# Patient Record
Sex: FEMALE
[De-identification: names, ages, dates, MRNs, and addresses within clinical notes are randomized per-mention and may not be internally consistent; named-entity substitution may affect disease eponyms.]

---

## 2018-12-05 PROBLEM — Z00.00 ENCOUNTER FOR PREVENTIVE HEALTH EXAMINATION: Status: ACTIVE | Noted: 2018-12-05

## 2018-12-11 ENCOUNTER — APPOINTMENT (OUTPATIENT)
Dept: MAMMOGRAPHY | Facility: CLINIC | Age: 38
End: 2018-12-11
Payer: COMMERCIAL

## 2018-12-11 ENCOUNTER — OUTPATIENT (OUTPATIENT)
Dept: OUTPATIENT SERVICES | Facility: HOSPITAL | Age: 38
LOS: 1 days | End: 2018-12-11
Payer: SELF-PAY

## 2018-12-11 DIAGNOSIS — Z00.8 ENCOUNTER FOR OTHER GENERAL EXAMINATION: ICD-10-CM

## 2018-12-11 DIAGNOSIS — Z12.31 ENCOUNTER FOR SCREENING MAMMOGRAM FOR MALIGNANT NEOPLASM OF BREAST: ICD-10-CM

## 2018-12-11 PROCEDURE — 77067 SCR MAMMO BI INCL CAD: CPT

## 2018-12-11 PROCEDURE — 77063 BREAST TOMOSYNTHESIS BI: CPT | Mod: 26

## 2018-12-11 PROCEDURE — 77067 SCR MAMMO BI INCL CAD: CPT | Mod: 26

## 2018-12-11 PROCEDURE — 77063 BREAST TOMOSYNTHESIS BI: CPT

## 2019-01-10 ENCOUNTER — EMERGENCY (EMERGENCY)
Facility: HOSPITAL | Age: 39
LOS: 1 days | Discharge: HOME | End: 2019-01-10
Attending: EMERGENCY MEDICINE
Payer: COMMERCIAL

## 2019-01-10 VITALS
RESPIRATION RATE: 22 BRPM | SYSTOLIC BLOOD PRESSURE: 130 MMHG | OXYGEN SATURATION: 100 % | WEIGHT: 119.93 LBS | DIASTOLIC BLOOD PRESSURE: 82 MMHG | HEIGHT: 62 IN | TEMPERATURE: 98 F | HEART RATE: 75 BPM

## 2019-01-10 LAB
ALBUMIN SERPL ELPH-MCNC: 4.6 G/DL — SIGNIFICANT CHANGE UP (ref 3.3–5.2)
ALP SERPL-CCNC: 83 U/L — SIGNIFICANT CHANGE UP (ref 40–120)
ALT FLD-CCNC: 36 U/L — HIGH
ANION GAP SERPL CALC-SCNC: 14 MMOL/L — SIGNIFICANT CHANGE UP (ref 5–17)
APPEARANCE UR: CLEAR — SIGNIFICANT CHANGE UP
AST SERPL-CCNC: 35 U/L — HIGH
BACTERIA # UR AUTO: ABNORMAL
BASOPHILS # BLD AUTO: 0 K/UL — SIGNIFICANT CHANGE UP (ref 0–0.2)
BASOPHILS NFR BLD AUTO: 0.2 % — SIGNIFICANT CHANGE UP (ref 0–2)
BILIRUB SERPL-MCNC: 0.3 MG/DL — LOW (ref 0.4–2)
BILIRUB UR-MCNC: NEGATIVE — SIGNIFICANT CHANGE UP
BUN SERPL-MCNC: 12 MG/DL — SIGNIFICANT CHANGE UP (ref 8–20)
CALCIUM SERPL-MCNC: 9.6 MG/DL — SIGNIFICANT CHANGE UP (ref 8.6–10.2)
CHLORIDE SERPL-SCNC: 102 MMOL/L — SIGNIFICANT CHANGE UP (ref 98–107)
CO2 SERPL-SCNC: 24 MMOL/L — SIGNIFICANT CHANGE UP (ref 22–29)
COLOR SPEC: YELLOW — SIGNIFICANT CHANGE UP
CREAT SERPL-MCNC: 0.58 MG/DL — SIGNIFICANT CHANGE UP (ref 0.5–1.3)
DIFF PNL FLD: ABNORMAL
EOSINOPHIL # BLD AUTO: 0.1 K/UL — SIGNIFICANT CHANGE UP (ref 0–0.5)
EOSINOPHIL NFR BLD AUTO: 0.8 % — SIGNIFICANT CHANGE UP (ref 0–6)
EPI CELLS # UR: ABNORMAL
GLUCOSE SERPL-MCNC: 86 MG/DL — SIGNIFICANT CHANGE UP (ref 70–115)
GLUCOSE UR QL: NEGATIVE MG/DL — SIGNIFICANT CHANGE UP
HCG SERPL-ACNC: <4 MIU/ML — SIGNIFICANT CHANGE UP
HCG UR QL: NEGATIVE — SIGNIFICANT CHANGE UP
HCT VFR BLD CALC: 38.2 % — SIGNIFICANT CHANGE UP (ref 37–47)
HGB BLD-MCNC: 12.9 G/DL — SIGNIFICANT CHANGE UP (ref 12–16)
KETONES UR-MCNC: NEGATIVE — SIGNIFICANT CHANGE UP
LEUKOCYTE ESTERASE UR-ACNC: NEGATIVE — SIGNIFICANT CHANGE UP
LIDOCAIN IGE QN: 33 U/L — SIGNIFICANT CHANGE UP (ref 22–51)
LYMPHOCYTES # BLD AUTO: 1.2 K/UL — SIGNIFICANT CHANGE UP (ref 1–4.8)
LYMPHOCYTES # BLD AUTO: 12.2 % — LOW (ref 20–55)
MCHC RBC-ENTMCNC: 28.2 PG — SIGNIFICANT CHANGE UP (ref 27–31)
MCHC RBC-ENTMCNC: 33.8 G/DL — SIGNIFICANT CHANGE UP (ref 32–36)
MCV RBC AUTO: 83.4 FL — SIGNIFICANT CHANGE UP (ref 81–99)
MONOCYTES # BLD AUTO: 0.9 K/UL — HIGH (ref 0–0.8)
MONOCYTES NFR BLD AUTO: 9.6 % — SIGNIFICANT CHANGE UP (ref 3–10)
NEUTROPHILS # BLD AUTO: 7.4 K/UL — SIGNIFICANT CHANGE UP (ref 1.8–8)
NEUTROPHILS NFR BLD AUTO: 76.9 % — HIGH (ref 37–73)
NITRITE UR-MCNC: NEGATIVE — SIGNIFICANT CHANGE UP
PH UR: 6 — SIGNIFICANT CHANGE UP (ref 5–8)
PLATELET # BLD AUTO: 198 K/UL — SIGNIFICANT CHANGE UP (ref 150–400)
POTASSIUM SERPL-MCNC: 4 MMOL/L — SIGNIFICANT CHANGE UP (ref 3.5–5.3)
POTASSIUM SERPL-SCNC: 4 MMOL/L — SIGNIFICANT CHANGE UP (ref 3.5–5.3)
PROT SERPL-MCNC: 7.9 G/DL — SIGNIFICANT CHANGE UP (ref 6.6–8.7)
PROT UR-MCNC: NEGATIVE MG/DL — SIGNIFICANT CHANGE UP
RBC # BLD: 4.58 M/UL — SIGNIFICANT CHANGE UP (ref 4.4–5.2)
RBC # FLD: 12.9 % — SIGNIFICANT CHANGE UP (ref 11–15.6)
RBC CASTS # UR COMP ASSIST: ABNORMAL /HPF (ref 0–4)
SODIUM SERPL-SCNC: 140 MMOL/L — SIGNIFICANT CHANGE UP (ref 135–145)
SP GR SPEC: 1.01 — SIGNIFICANT CHANGE UP (ref 1.01–1.02)
UROBILINOGEN FLD QL: NEGATIVE MG/DL — SIGNIFICANT CHANGE UP
WBC # BLD: 9.6 K/UL — SIGNIFICANT CHANGE UP (ref 4.8–10.8)
WBC # FLD AUTO: 9.6 K/UL — SIGNIFICANT CHANGE UP (ref 4.8–10.8)
WBC UR QL: SIGNIFICANT CHANGE UP

## 2019-01-10 PROCEDURE — 99284 EMERGENCY DEPT VISIT MOD MDM: CPT

## 2019-01-10 PROCEDURE — 99282 EMERGENCY DEPT VISIT SF MDM: CPT

## 2019-01-10 RX ORDER — KETOROLAC TROMETHAMINE 30 MG/ML
30 SYRINGE (ML) INJECTION ONCE
Qty: 0 | Refills: 0 | Status: DISCONTINUED | OUTPATIENT
Start: 2019-01-10 | End: 2019-01-10

## 2019-01-10 RX ORDER — SODIUM CHLORIDE 9 MG/ML
3 INJECTION INTRAMUSCULAR; INTRAVENOUS; SUBCUTANEOUS ONCE
Qty: 0 | Refills: 0 | Status: COMPLETED | OUTPATIENT
Start: 2019-01-10 | End: 2019-01-10

## 2019-01-10 RX ORDER — SODIUM CHLORIDE 9 MG/ML
1000 INJECTION INTRAMUSCULAR; INTRAVENOUS; SUBCUTANEOUS ONCE
Qty: 0 | Refills: 0 | Status: COMPLETED | OUTPATIENT
Start: 2019-01-10 | End: 2019-01-10

## 2019-01-10 RX ORDER — ONDANSETRON 8 MG/1
4 TABLET, FILM COATED ORAL ONCE
Qty: 0 | Refills: 0 | Status: COMPLETED | OUTPATIENT
Start: 2019-01-10 | End: 2019-01-10

## 2019-01-10 RX ORDER — MORPHINE SULFATE 50 MG/1
4 CAPSULE, EXTENDED RELEASE ORAL ONCE
Qty: 0 | Refills: 0 | Status: DISCONTINUED | OUTPATIENT
Start: 2019-01-10 | End: 2019-01-10

## 2019-01-10 RX ADMIN — SODIUM CHLORIDE 3 MILLILITER(S): 9 INJECTION INTRAMUSCULAR; INTRAVENOUS; SUBCUTANEOUS at 22:50

## 2019-01-10 RX ADMIN — ONDANSETRON 4 MILLIGRAM(S): 8 TABLET, FILM COATED ORAL at 22:49

## 2019-01-10 NOTE — ED PROVIDER NOTE - ENMT, MLM
Airway patent, Nasal mucosa clear. Mouth with normal mucosa. No oropharyngeal exudates and uvula is midline.

## 2019-01-10 NOTE — ED PROVIDER NOTE - PHYSICAL EXAMINATION
Patient examined by pradeep Rollins chest clear heart regular rhythm abd marked left lower quadrant tenderness with guarding and rigidity.  r/o torsion of ovarian cyst r/o diverticulitis

## 2019-01-10 NOTE — ED PROVIDER NOTE - MEDICAL DECISION MAKING DETAILS
will do cbc cmp labs will do first ultrasound and medicate for pain. If gyn workup is negative will do ct of abd and pelvis to r/o diverticulitis.

## 2019-01-10 NOTE — ED STATDOCS - OBJECTIVE STATEMENT
37 y/o F pt presents to the ED c/o worsening abdominal pain beginning yesterday.  Pt reports LLQ abdominal pain beginning yesterday.  Today her pain worsened, pt went into Urgent Care, and was told to come into the ED for further evaluation.  Denies N/V.  Exam: LLQ tenderness, + guarding  Pt will be sent to the Main ED for further treatment and evaluation. Initial orders placed. r/o torsion vs ovarian cyst, will medicate first, then obtain US. Pt is a 38y F with no PMH presenting with LLQ pain x 5 days. Pt states she had associated dysuria so thought she had a UTI. She went to her PMH x 2 days ago who checked her urine (negative urinalysis) but pt was placed on abx. She states her dysuria has subsided but she still has LLQ pain. She went to urgent care today and was sent here for further workup. She denies any fever/vaginal bleeding/diarrhea/vomiting. She does not use birth control. LMP was Dec. 24th. No other complaints.

## 2019-01-10 NOTE — ED PROVIDER NOTE - OBJECTIVE STATEMENT
Pt is 38y F w/ no PMHx, LMP ,  c/o of abdominal pain. Pain started yesterday, in LLQ, cramping in nature and is 9-10/10 severity. She states the pain makes it difficult for her to sit still. She denies N/V, fever, SOB sick contacts, hx of ovarian cysts, constipation/diarrhea or urinary frequency/dysuria. Did see her PMD last week for similar pain in LLQ which less severe but sxs resolved quickly at that time. Pt is 38y F w/ no PMHx, LMP ,  c/o of abdominal pain. Pain started yesterday, in LLQ, cramping in nature and is 9-10/10 severity. She states the pain makes it difficult for her to sit still. She denies N/V, fever, SOB sick contacts, hx of ovarian cysts, constipation/diarrhea or urinary frequency/dysuria. Did see her PMD last week for similar pain in LLQ which less severe but sxs resolved quickly at that time.   Attending note Hx as above. I was present when hx wa taken

## 2019-01-10 NOTE — ED PROVIDER NOTE - CHPI ED SYMPTOMS NEG
no dysuria/no fever/no hematuria/no nausea/no vomiting/no diarrhea/no burning urination/no abdominal distension

## 2019-01-10 NOTE — ED STATDOCS - CHPI ED SYMPTOM NEG
no hematuria/no abdominal distention/no diarrhea/no vomiting/no palpitations/no burning urination/no fever/no blood in stool/no dysuria

## 2019-01-10 NOTE — ED STATDOCS - ATTENDING CONTRIBUTION TO CARE
I, Mt Rollins, performed the initial face to face bedside interview with this patient regarding history of present illness, review of symptoms and relevant past medical, social and family history.  I completed an independent physical examination.  I was the initial provider who evaluated this patient. I have signed out the follow up of any pending tests (i.e. labs, radiological studies) to the ACP.  I have communicated the patient’s plan of care and disposition with the ACP.  The history, relevant review of systems, past medical and surgical history, medical decision making, and physical examination was documented by the scribe in my presence and I attest to the accuracy of the documentation.

## 2019-01-10 NOTE — ED ADULT TRIAGE NOTE - CHIEF COMPLAINT QUOTE
seen at [facility] MD pain in abdomen started yesterday no nausea or vomiting

## 2019-01-10 NOTE — ED STATDOCS - PROGRESS NOTE DETAILS
37 y/o F pt presents to the ED c/o worsening abdominal pain beginning yesterday.  Pt reports LLQ abdominal pain beginning yesterday.  Today her pain worsened, pt went into Urgent Care, and was told to come into the ED for further evaluation.  Denies N/V.  Exam: LLQ tenderness, + guarding  Pt will be sent to the Main ED for further treatment and evaluation. Initial orders placed. r/o torsion vs ovarian cyst, will medicate first, then obtain US. FOUZIA Coker note-US called after seeing pt. they state pt is next for US to r/o torsion. Pt seen by OB and they report pt can be d/c with outpt f/u. They recommend motrin 600mg q6h prn pain. Will dc. Pt feeling better.

## 2019-01-11 VITALS
HEART RATE: 76 BPM | OXYGEN SATURATION: 100 % | SYSTOLIC BLOOD PRESSURE: 116 MMHG | DIASTOLIC BLOOD PRESSURE: 68 MMHG | TEMPERATURE: 98 F | RESPIRATION RATE: 16 BRPM

## 2019-01-11 LAB
ALLERGY+IMMUNOLOGY DIAG STUDY NOTE: SIGNIFICANT CHANGE UP
APTT BLD: 36.1 SEC — SIGNIFICANT CHANGE UP (ref 27.5–36.3)
BLD GP AB SCN SERPL QL: ABNORMAL
DAT IGG-SP REAG RBC-IMP: ABNORMAL
DIR ANTIGLOB POLYSPECIFIC INTERPRETATION: ABNORMAL
IAT COMP-SP REAG SERPL QL: ABNORMAL
INR BLD: 1.13 RATIO — SIGNIFICANT CHANGE UP (ref 0.88–1.16)
PROTHROM AB SERPL-ACNC: 13 SEC — HIGH (ref 10–12.9)
TYPE + AB SCN PNL BLD: SIGNIFICANT CHANGE UP

## 2019-01-11 PROCEDURE — 86870 RBC ANTIBODY IDENTIFICATION: CPT

## 2019-01-11 PROCEDURE — 76856 US EXAM PELVIC COMPLETE: CPT | Mod: 26

## 2019-01-11 PROCEDURE — 76856 US EXAM PELVIC COMPLETE: CPT

## 2019-01-11 PROCEDURE — 76830 TRANSVAGINAL US NON-OB: CPT

## 2019-01-11 PROCEDURE — 76830 TRANSVAGINAL US NON-OB: CPT | Mod: 26

## 2019-01-11 PROCEDURE — 81001 URINALYSIS AUTO W/SCOPE: CPT

## 2019-01-11 PROCEDURE — 86900 BLOOD TYPING SEROLOGIC ABO: CPT

## 2019-01-11 PROCEDURE — 86850 RBC ANTIBODY SCREEN: CPT

## 2019-01-11 PROCEDURE — 80053 COMPREHEN METABOLIC PANEL: CPT

## 2019-01-11 PROCEDURE — 85610 PROTHROMBIN TIME: CPT

## 2019-01-11 PROCEDURE — 86880 COOMBS TEST DIRECT: CPT

## 2019-01-11 PROCEDURE — 85027 COMPLETE CBC AUTOMATED: CPT

## 2019-01-11 PROCEDURE — 85730 THROMBOPLASTIN TIME PARTIAL: CPT

## 2019-01-11 PROCEDURE — 81025 URINE PREGNANCY TEST: CPT

## 2019-01-11 PROCEDURE — 86901 BLOOD TYPING SEROLOGIC RH(D): CPT

## 2019-01-11 PROCEDURE — 96374 THER/PROPH/DIAG INJ IV PUSH: CPT

## 2019-01-11 PROCEDURE — 36415 COLL VENOUS BLD VENIPUNCTURE: CPT

## 2019-01-11 PROCEDURE — 83690 ASSAY OF LIPASE: CPT

## 2019-01-11 PROCEDURE — 99284 EMERGENCY DEPT VISIT MOD MDM: CPT | Mod: 25

## 2019-01-11 PROCEDURE — 84702 CHORIONIC GONADOTROPIN TEST: CPT

## 2019-01-11 PROCEDURE — 96375 TX/PRO/DX INJ NEW DRUG ADDON: CPT

## 2019-01-11 RX ORDER — IBUPROFEN 200 MG
1 TABLET ORAL
Qty: 28 | Refills: 0 | OUTPATIENT
Start: 2019-01-11 | End: 2019-01-17

## 2019-01-11 RX ORDER — CEFTRIAXONE 500 MG/1
250 INJECTION, POWDER, FOR SOLUTION INTRAMUSCULAR; INTRAVENOUS
Qty: 250 | Refills: 0 | OUTPATIENT
Start: 2019-01-11 | End: 2019-01-11

## 2019-01-11 RX ADMIN — SODIUM CHLORIDE 1000 MILLILITER(S): 9 INJECTION INTRAMUSCULAR; INTRAVENOUS; SUBCUTANEOUS at 02:15

## 2019-01-11 RX ADMIN — Medication 30 MILLIGRAM(S): at 02:15

## 2019-01-11 NOTE — ED ADULT NURSE NOTE - CAS EDN DISCHARGE ASSESSMENT
Alert and oriented to person, place and time/Patient baseline mental status
low imminent risk No dangerous symptoms elicited

## 2019-01-11 NOTE — CHART NOTE - NSCHARTNOTEFT_GEN_A_CORE
39 yo female seen in the ER for abdominal pain. A KEVIN was ordered. Patient is an O positive blood type with a positive KEVIN. IgG and C3 both weakly positive.  An eluate could not be completed due to lack of sample. An additional antibody screen was weakly positive on 2 out of three cells. This may represent the presence of an allo-antibody in addition to the autoantibody. The Auto control was negative. This antibody pattern suggests a weak warm and/or cold antibody, even with negative auto-control. The variable reaction on the antibody screen may represent a developing allo-antibody. Patient was discharged before additional sample could be collected. Recommend repeat Type & Screen and KEVIN on next medical encounter. Draw two specimen tubes for blood bank testing. Should patient require blood, full crossmatch should be performed except on condition of emergency. 39 yo female seen in the ER for abdominal pain. A KEVIN was ordered. Patient is an O positive blood type with a positive KEVIN. IgG and C3 both weakly positive.  An eluate could not be completed due to lack of sample. An additional antibody screen was weakly positive on 2 out of three cells. This may represent the presence of an allo-antibody in addition to the autoantibody. The Auto control was negative. This antibody pattern suggests a weak warm and/or cold antibody, even with negative auto-control. The variable reaction on the antibody screen may represent a developing allo-antibody. Patient was discharged before additional sample could be collected. Recommend repeat Type & Screen and KEVIN on next medical encounter. Draw two specimen tubes for blood bank testing. Should patient require blood, full crossmatch should be performed except on condition of emergency.  Z01.84

## 2019-01-11 NOTE — CONSULT NOTE ADULT - SUBJECTIVE AND OBJECTIVE BOX
Pt was seen and examined in the ES at bedside with Dr. Hardin    Pt is a 37 yo  with LPM 18 presented to ED with severe L lower abdominal / pelvic pain. The pain started yesterday and gotten worse overtime. Currently pt does not have any pain, she received toradol 2 h ago.  Pt denies N/V VB, vag. discharge. frequency, urgency, burining with urination.     OBJ: comfortable, A&O x3  Vital Signs Last 24 Hrs  T(C): 36.8 (2019 04:07), Max: 36.8 (2019 04:07)  T(F): 98.2 (2019 04:07), Max: 98.2 (2019 04:07)  HR: 79 (2019 04:07) (75 - 79)  BP: 115/69 (2019 04:07) (115/69 - 130/82)  RR: 20 (2019 04:07) (20 - 22)  SpO2: 99% (2019 04:07) (99% - 100%)                  12.9   9.6   )-----------( 198      ( 10 Patel 2019 22:49 )             38.2     Abdomen: right lower abdominal rebound tenderness  marked l lower abdominal tenderness on deep palpation  VE: bilateral adnexal tenderness more on the L side.  Marked cervical motion tenderness.    < from: US Transvaginal (19 @ 01:52) >  PROCEDURE DATE:  2019          INTERPRETATION:  CLINICAL INFORMATION: Left-sided pelvic pain,   nonpregnant female.    LMP: 2018    COMPARISON: None available.    TECHNIQUE: Transabdominal and endovaginal pelvic sonogram is performed.    FINDINGS:    Uterus: 7.1 x 3.5 x 4.7 cm. Within normal limits.    Endometrium: 10 mm. Within normal limits.    Right ovary: 2.5 x 1.6 x 1.7 cm. Within normal limits. Flow/waveforms   documented.    Left ovary: 2.7 x 2.2 x 2.0 cm. Complex left adnexal cyst measuring up to   3.9 x 3.2 x 3.5 cm is inseparable from the left ovary. Flow/waveforms are   documented within the left ovary. cm. Within normal limits.    Fluid: Trace pelvic free fluid.    IMPRESSION:    Complex left adnexal cyst measuring up to 3.9 cm which is inseparable   from the left ovary and could be a nidus for ovarian torsion. Vascular   flow is demonstrated to the left ovary at this time, however,   torsion/detorsion should be excluded on clinical grounds.    < end of copied text > Pt was seen and examined in the ES at bedside with Dr. Hardin    Pt is a 39 yo  with LPM 18 presented to ED with severe L lower abdominal / pelvic pain. The pain started yesterday and gotten worse overtime. Currently pt does not have any pain, she received toradol 2 h ago.  Pt denies N/V VB, vag. discharge. frequency, urgency, burining with urination.  Patient reports was seen by her Gyn approx 1 week ago with complaint of lower abdominal pain, had cultures taken, was treated for presumed UTI  Patient sexually active with boyfriend of 1 year. Last coitus one week ago with no use of condoms.  OBJ: comfortable, A&O x3  Vital Signs Last 24 Hrs  T(C): 36.8 (2019 04:07), Max: 36.8 (2019 04:07)  T(F): 98.2 (2019 04:07), Max: 98.2 (2019 04:07)  HR: 79 (2019 04:07) (75 - 79)  BP: 115/69 (2019 04:07) (115/69 - 130/82)  RR: 20 (2019 04:07) (20 - 22)  SpO2: 99% (2019 04:07) (99% - 100%)                  12.9   9.6   )-----------( 198      ( 10 Patel 2019 22:49 )             38.2     Abdomen: right lower abdominal rebound tenderness  marked l lower abdominal tenderness on deep palpation  VE: bilateral adnexal tenderness more on the L side.  Marked cervical motion tenderness.  vagina: white homogenous discharge,    < from: US Transvaginal (19 @ 01:52) >  PROCEDURE DATE:  2019          INTERPRETATION:  CLINICAL INFORMATION: Left-sided pelvic pain,   nonpregnant female.    LMP: 2018    COMPARISON: None available.    TECHNIQUE: Transabdominal and endovaginal pelvic sonogram is performed.    FINDINGS:    Uterus: 7.1 x 3.5 x 4.7 cm. Within normal limits.    Endometrium: 10 mm. Within normal limits.    Right ovary: 2.5 x 1.6 x 1.7 cm. Within normal limits. Flow/waveforms   documented.    Left ovary: 2.7 x 2.2 x 2.0 cm. Complex left adnexal cyst measuring up to   3.9 x 3.2 x 3.5 cm is inseparable from the left ovary. Flow/waveforms are   documented within the left ovary. cm. Within normal limits.    Fluid: Trace pelvic free fluid.    IMPRESSION:    Complex left adnexal cyst measuring up to 3.9 cm which is inseparable   from the left ovary and could be a nidus for ovarian torsion. Vascular   flow is demonstrated to the left ovary at this time, however,   torsion/detorsion should be excluded on clinical grounds.    < end of copied text >

## 2019-01-11 NOTE — CONSULT NOTE ADULT - ASSESSMENT
A/P  39 yo  with LMP 18, cycle day 17 with lower abdominal pain  Differential diagnosis  -PID  -Mittleschmerz  -endometritis  -appendicitis  -intermittent torsion - less likely given absence of N/V, severity of pain    Plan:  Pt should follow up with her primary care OBGYN for final diagnosis  Pt should take Ibuprofen as needed for pain  Pt should return to ED if she develops severe pain, becomes hemodynamically unstable or other.    D/W Dr. Hardin A/P  39 yo  with LMP 18, cycle day 17 with suspected   -PID  -appendicitis intermittent torsion - less likely given absence of N/V, severity of pain    Plan:  treat emperically for PID  Pt should take Ibuprofen as needed for pain  Pt should return to ED if she develops severe pain, becomes hemodynamically unstable or other.  Follow up with Gynecologist next week    D/W Dr. Hardin  ATTENDING NOTE  Patient was seen and examined with the resident.  I agree with above assessment and plan.  MARIELY Hardin MD

## 2020-04-30 ENCOUNTER — RESULT REVIEW (OUTPATIENT)
Age: 40
End: 2020-04-30

## 2021-02-03 NOTE — ED ADULT TRIAGE NOTE - NS ED NURSE BANDS TYPE
Final Anesthesia Post-op Assessment    Patient: Tiffany ROBLES Avalos  Procedure(s) Performed: (OA) COLON COMING IN AT 12:00 FOR RAPID COVID TEST  Anesthesia type: MAC    Vitals Value Taken Time   Temp 97.6 02/02/21 1825   Pulse 73 02/02/21 1525   Resp 12 02/02/21 1825   SpO2 99 % 02/02/21 1520   /74 02/02/21 1525         Patient Location: PACU Phase 1  Post-op Vital Signs:stable  Level of Consciousness: awake, alert, participates in exam and oriented  Respiratory Status: spontaneous ventilation  Cardiovascular stable  Hydration: euvolemic  Pain Management: adequately controlled  Handoff: Handoff to receiving nurse was performed and questions were answered  Vomiting: none   Nausea: None  Airway Patency:patent  Post-op Assessment: awake, alert, appropriately conversant, or baseline, no complications, patient tolerated procedure well with no complications and no evidence of recall      No complications documented.   
Name band;

## 2021-05-11 ENCOUNTER — RESULT REVIEW (OUTPATIENT)
Age: 41
End: 2021-05-11